# Patient Record
Sex: MALE | Race: WHITE | NOT HISPANIC OR LATINO | URBAN - METROPOLITAN AREA
[De-identification: names, ages, dates, MRNs, and addresses within clinical notes are randomized per-mention and may not be internally consistent; named-entity substitution may affect disease eponyms.]

---

## 2020-07-31 ENCOUNTER — OFFICE VISIT (OUTPATIENT)
Dept: URBAN - METROPOLITAN AREA CLINIC 70 | Facility: CLINIC | Age: 25
End: 2020-07-31
Payer: COMMERCIAL

## 2020-07-31 ENCOUNTER — WEB ENCOUNTER (OUTPATIENT)
Dept: URBAN - METROPOLITAN AREA CLINIC 70 | Facility: CLINIC | Age: 25
End: 2020-07-31

## 2020-07-31 DIAGNOSIS — R10.813 RLQ ABDOMINAL TENDERNESS: ICD-10-CM

## 2020-07-31 DIAGNOSIS — R10.31 RLQ ABDOMINAL PAIN: ICD-10-CM

## 2020-07-31 PROCEDURE — G8427 DOCREV CUR MEDS BY ELIG CLIN: HCPCS | Performed by: INTERNAL MEDICINE

## 2020-07-31 PROCEDURE — 99204 OFFICE O/P NEW MOD 45 MIN: CPT | Performed by: INTERNAL MEDICINE

## 2020-07-31 PROCEDURE — 1036F TOBACCO NON-USER: CPT | Performed by: INTERNAL MEDICINE

## 2020-07-31 PROCEDURE — G8417 CALC BMI ABV UP PARAM F/U: HCPCS | Performed by: INTERNAL MEDICINE

## 2020-07-31 RX ORDER — DICLOFENAC 35 MG/1
1 CAPSULE AS NEEDED CAPSULE ORAL THREE TIMES A DAY
Qty: 42 CAPSULE | Refills: 0 | OUTPATIENT
Start: 2020-07-31 | End: 2020-08-14

## 2020-07-31 RX ORDER — METHYLPREDNISOLONE 4 MG/1
AS DIRECTED TABLET ORAL ONCE A DAY
Qty: 10 | Refills: 0 | OUTPATIENT
Start: 2020-07-31 | End: 2020-08-15

## 2020-07-31 NOTE — HPI-TODAY'S VISIT:
25 yr old male is here is for RLQ abd pain x Feb 2020 Pain is intermittent and dull to sharp and at times goes towards his Rt genitalia It's worsend with heavy lifting or prolonged driving  No associated nausea or vomiting  No local swelling  He has tried OTC NSAIDs with no relief  He has had CT abd / pelvis and then US of lower abdomen as well Rt testicle and no hernia was found  Denies any alteration in his bowel habits and no GI bleeding  No weight loss   Works in logistics area of school system

## 2020-07-31 NOTE — PHYSICAL EXAM GASTROINTESTINAL
Abdomen , soft, nontender, nondistended , no guarding or rigidity , no masses palpable , normal bowel sounds , Liver and Spleen , no hepatomegaly present , no hepatosplenomegaly , liver nontender , spleen not palpable  Tenderness in Rt inguinal area and no masses

## 2020-08-01 LAB — SEDIMENTATION RATE-WESTERGREN: 4

## 2020-08-10 ENCOUNTER — TELEPHONE ENCOUNTER (OUTPATIENT)
Dept: URBAN - METROPOLITAN AREA CLINIC 70 | Facility: CLINIC | Age: 25
End: 2020-08-10

## 2020-09-02 ENCOUNTER — OFFICE VISIT (OUTPATIENT)
Dept: URBAN - METROPOLITAN AREA CLINIC 70 | Facility: CLINIC | Age: 25
End: 2020-09-02
Payer: COMMERCIAL

## 2020-09-02 ENCOUNTER — DASHBOARD ENCOUNTERS (OUTPATIENT)
Age: 25
End: 2020-09-02

## 2020-09-02 DIAGNOSIS — M79.18 MUSCULOSKELETAL PAIN: ICD-10-CM

## 2020-09-02 PROCEDURE — G8427 DOCREV CUR MEDS BY ELIG CLIN: HCPCS | Performed by: INTERNAL MEDICINE

## 2020-09-02 PROCEDURE — 1036F TOBACCO NON-USER: CPT | Performed by: INTERNAL MEDICINE

## 2020-09-02 PROCEDURE — G8417 CALC BMI ABV UP PARAM F/U: HCPCS | Performed by: INTERNAL MEDICINE

## 2020-09-02 PROCEDURE — 99213 OFFICE O/P EST LOW 20 MIN: CPT | Performed by: INTERNAL MEDICINE

## 2020-09-02 NOTE — HPI-TODAY'S VISIT:
His pains disappeared completely after with tapered steroid.  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

## 2020-11-04 ENCOUNTER — OFFICE VISIT (OUTPATIENT)
Dept: URBAN - METROPOLITAN AREA CLINIC 70 | Facility: CLINIC | Age: 25
End: 2020-11-04

## 2020-12-09 ENCOUNTER — OFFICE VISIT (OUTPATIENT)
Dept: URBAN - METROPOLITAN AREA CLINIC 70 | Facility: CLINIC | Age: 25
End: 2020-12-09